# Patient Record
Sex: MALE | Race: WHITE | NOT HISPANIC OR LATINO | Employment: OTHER | URBAN - METROPOLITAN AREA
[De-identification: names, ages, dates, MRNs, and addresses within clinical notes are randomized per-mention and may not be internally consistent; named-entity substitution may affect disease eponyms.]

---

## 2019-12-10 PROBLEM — M25.552 PAIN OF BOTH HIP JOINTS: Status: ACTIVE | Noted: 2019-05-08

## 2019-12-10 PROBLEM — N31.9 BLADDER DYSFUNCTION: Status: ACTIVE | Noted: 2019-10-10

## 2019-12-10 PROBLEM — I71.21 ASCENDING AORTIC ANEURYSM: Status: ACTIVE | Noted: 2019-09-18

## 2019-12-10 PROBLEM — J44.9 COPD (CHRONIC OBSTRUCTIVE PULMONARY DISEASE): Status: ACTIVE | Noted: 2019-12-10

## 2019-12-10 PROBLEM — I25.10 CORONARY ARTERY DISEASE INVOLVING NATIVE CORONARY ARTERY OF NATIVE HEART WITHOUT ANGINA PECTORIS: Status: ACTIVE | Noted: 2018-03-21

## 2019-12-10 PROBLEM — R54 FRAILTY: Status: ACTIVE | Noted: 2019-10-10

## 2019-12-10 PROBLEM — I42.0 DILATED CARDIOMYOPATHY: Status: ACTIVE | Noted: 2017-11-16

## 2019-12-10 PROBLEM — G89.29 CHRONIC LEFT HIP PAIN: Status: ACTIVE | Noted: 2019-08-23

## 2019-12-10 PROBLEM — M25.552 CHRONIC LEFT HIP PAIN: Status: ACTIVE | Noted: 2019-08-23

## 2019-12-10 PROBLEM — N18.30 CHRONIC RENAL DISEASE, STAGE 3, MODERATELY DECREASED GLOMERULAR FILTRATION RATE BETWEEN 30-59 ML/MIN/1.73 SQUARE METER: Status: ACTIVE | Noted: 2019-09-06

## 2019-12-10 PROBLEM — N28.89 BILATERAL RENAL MASSES: Status: ACTIVE | Noted: 2019-12-10

## 2019-12-10 PROBLEM — E78.2 MIXED HYPERLIPIDEMIA: Status: ACTIVE | Noted: 2017-08-25

## 2019-12-10 PROBLEM — M25.561 CHRONIC PAIN OF BOTH KNEES: Status: ACTIVE | Noted: 2019-05-08

## 2019-12-10 PROBLEM — M85.859 OSTEOPENIA OF HIP: Status: ACTIVE | Noted: 2019-09-04

## 2019-12-10 PROBLEM — M25.511 CHRONIC PAIN OF BOTH SHOULDERS: Status: ACTIVE | Noted: 2018-03-07

## 2019-12-10 PROBLEM — G89.29 CHRONIC PAIN OF BOTH KNEES: Status: ACTIVE | Noted: 2019-05-08

## 2019-12-10 PROBLEM — Q87.40 MARFAN'S SYNDROME: Status: ACTIVE | Noted: 2019-12-10

## 2019-12-10 PROBLEM — M16.12 PRIMARY OSTEOARTHRITIS OF LEFT HIP: Status: ACTIVE | Noted: 2019-08-23

## 2019-12-10 PROBLEM — Z95.810 BIVENTRICULAR AUTOMATIC IMPLANTABLE CARDIOVERTER DEFIBRILLATOR IN SITU: Status: ACTIVE | Noted: 2019-12-10

## 2019-12-10 PROBLEM — M25.562 CHRONIC PAIN OF BOTH KNEES: Status: ACTIVE | Noted: 2019-05-08

## 2019-12-10 PROBLEM — G89.29 CHRONIC LEFT SHOULDER PAIN: Status: ACTIVE | Noted: 2018-03-07

## 2019-12-10 PROBLEM — I50.22 CHRONIC SYSTOLIC CONGESTIVE HEART FAILURE: Status: ACTIVE | Noted: 2019-12-10

## 2019-12-10 PROBLEM — I10 ESSENTIAL HYPERTENSION: Status: ACTIVE | Noted: 2017-08-25

## 2019-12-10 PROBLEM — M25.551 PAIN OF BOTH HIP JOINTS: Status: ACTIVE | Noted: 2019-05-08

## 2019-12-10 PROBLEM — M25.512 CHRONIC LEFT SHOULDER PAIN: Status: ACTIVE | Noted: 2018-03-07

## 2019-12-20 PROBLEM — Z95.2 H/O MECHANICAL AORTIC VALVE REPLACEMENT: Status: ACTIVE | Noted: 2019-12-20

## 2020-06-23 PROBLEM — G89.29 CHRONIC LEFT HIP PAIN: Status: RESOLVED | Noted: 2019-08-23 | Resolved: 2020-06-23

## 2020-06-23 PROBLEM — M25.552 CHRONIC LEFT HIP PAIN: Status: RESOLVED | Noted: 2019-08-23 | Resolved: 2020-06-23

## 2021-05-12 PROBLEM — K59.00 CONSTIPATION: Status: ACTIVE | Noted: 2021-05-12

## 2021-05-25 PROBLEM — N30.01 ACUTE CYSTITIS WITH HEMATURIA: Status: ACTIVE | Noted: 2021-05-25

## 2021-05-27 PROBLEM — I50.22 CHRONIC SYSTOLIC CONGESTIVE HEART FAILURE: Status: RESOLVED | Noted: 2019-12-10 | Resolved: 2021-05-27

## 2021-06-01 ENCOUNTER — PATIENT OUTREACH (OUTPATIENT)
Dept: ADMINISTRATIVE | Facility: CLINIC | Age: 71
End: 2021-06-01

## 2021-06-02 PROBLEM — R22.42 MASS OF LEFT FOOT: Status: ACTIVE | Noted: 2021-06-02

## 2021-07-22 PROBLEM — M54.42 MIDLINE LOW BACK PAIN WITH LEFT-SIDED SCIATICA: Status: ACTIVE | Noted: 2021-07-22

## 2021-07-22 PROBLEM — M89.9 LYTIC LESION OF BONE ON X-RAY: Status: ACTIVE | Noted: 2021-07-22

## 2021-10-12 PROBLEM — I50.9 CHF WITH CARDIOMYOPATHY: Status: ACTIVE | Noted: 2021-10-12

## 2021-10-12 PROBLEM — I42.9 CHF WITH CARDIOMYOPATHY: Status: ACTIVE | Noted: 2021-10-12

## 2021-10-15 PROBLEM — K59.00 CONSTIPATION: Status: RESOLVED | Noted: 2021-05-12 | Resolved: 2021-10-15

## 2021-10-15 PROBLEM — N30.01 ACUTE CYSTITIS WITH HEMATURIA: Status: RESOLVED | Noted: 2021-05-25 | Resolved: 2021-10-15

## 2021-10-15 PROBLEM — T82.837A PACEMAKER POCKET HEMATOMA: Status: ACTIVE | Noted: 2021-10-15

## 2021-10-15 PROBLEM — N18.30 CHRONIC RENAL DISEASE, STAGE 3, MODERATELY DECREASED GLOMERULAR FILTRATION RATE BETWEEN 30-59 ML/MIN/1.73 SQUARE METER: Status: RESOLVED | Noted: 2019-09-06 | Resolved: 2021-10-15

## 2021-10-20 PROBLEM — I42.0 DILATED CARDIOMYOPATHY: Status: RESOLVED | Noted: 2017-11-16 | Resolved: 2021-10-20

## 2022-04-07 PROBLEM — F11.90 CHRONIC NARCOTIC USE: Status: ACTIVE | Noted: 2022-04-07

## 2022-04-07 PROBLEM — M19.031 ARTHRITIS OF BOTH WRISTS: Status: ACTIVE | Noted: 2022-04-07

## 2022-04-07 PROBLEM — M19.032 ARTHRITIS OF BOTH WRISTS: Status: ACTIVE | Noted: 2022-04-07

## 2022-05-04 PROBLEM — L02.612 ABSCESS OF LEFT FOOT: Status: ACTIVE | Noted: 2022-05-04

## 2022-05-04 PROBLEM — R19.5 POSITIVE FECAL OCCULT BLOOD TEST: Status: ACTIVE | Noted: 2022-05-04

## 2022-07-18 PROBLEM — Z79.01 ANTICOAGULATION MONITORING, INR RANGE 2.5-3.5: Status: ACTIVE | Noted: 2022-07-18

## 2022-09-22 PROBLEM — U07.1 COVID-19: Status: ACTIVE | Noted: 2022-09-22

## 2022-09-22 PROBLEM — R06.02 SHORTNESS OF BREATH: Status: ACTIVE | Noted: 2022-09-22

## 2022-09-22 PROBLEM — R13.10 DYSPHAGIA: Status: ACTIVE | Noted: 2022-09-22

## 2022-09-24 PROBLEM — R42 DIZZINESS: Status: ACTIVE | Noted: 2022-09-24

## 2022-09-24 PROBLEM — I71.40 ABDOMINAL AORTIC ANEURYSM (AAA) WITHOUT RUPTURE: Status: ACTIVE | Noted: 2022-09-24

## 2022-09-25 PROBLEM — I95.9 HYPOTENSION: Status: ACTIVE | Noted: 2022-09-25

## 2022-09-26 PROBLEM — I95.9 HYPOTENSION: Status: RESOLVED | Noted: 2022-09-25 | Resolved: 2022-09-26

## 2022-09-27 ENCOUNTER — PATIENT OUTREACH (OUTPATIENT)
Dept: ADMINISTRATIVE | Facility: CLINIC | Age: 72
End: 2022-09-27
Payer: MEDICARE

## 2022-09-27 NOTE — PROGRESS NOTES
C3 nurse attempted to contact Allijose Mcnally for a TCC post hospital discharge follow up call. The patient is unable to conduct the call @ this time.     The patient does not have a scheduled HOS[facility] appointment within 5-7 days post hospital discharge date 9/26/22. Message sent to Physician staff to assist with HOS[facility] appointment scheduling.

## 2022-10-17 PROBLEM — R53.1 WEAKNESS: Status: ACTIVE | Noted: 2022-10-17

## 2022-10-24 PROBLEM — R51.9 CHRONIC INTRACTABLE HEADACHE: Status: ACTIVE | Noted: 2022-10-24

## 2022-10-24 PROBLEM — G89.29 CHRONIC INTRACTABLE HEADACHE: Status: ACTIVE | Noted: 2022-10-24

## 2022-10-25 PROBLEM — R91.8 MASS OF UPPER LOBE OF LEFT LUNG: Status: ACTIVE | Noted: 2022-10-25

## 2022-10-25 PROBLEM — G44.89 OTHER HEADACHE SYNDROME: Status: ACTIVE | Noted: 2022-10-24

## 2022-10-25 PROBLEM — R51.9 INTRACTABLE HEADACHE: Status: ACTIVE | Noted: 2022-10-25

## 2022-10-27 PROBLEM — S06.5XAA BILATERAL SUBDURAL HEMATOMAS: Status: ACTIVE | Noted: 2022-10-27

## 2022-10-31 ENCOUNTER — PATIENT OUTREACH (OUTPATIENT)
Dept: ADMINISTRATIVE | Facility: CLINIC | Age: 72
End: 2022-10-31
Payer: MEDICARE

## 2022-11-01 PROBLEM — S06.5XAA SUBDURAL HEMATOMA: Status: ACTIVE | Noted: 2022-11-01

## 2022-11-01 PROBLEM — I49.9 CARDIAC ARRHYTHMIA: Status: ACTIVE | Noted: 2022-11-01

## 2022-11-01 PROBLEM — E87.6 HYPOKALEMIA: Status: ACTIVE | Noted: 2022-11-01

## 2022-11-02 PROBLEM — E87.6 HYPOKALEMIA: Status: RESOLVED | Noted: 2022-11-01 | Resolved: 2022-11-02

## 2022-11-02 PROBLEM — Z95.810 BIVENTRICULAR AUTOMATIC IMPLANTABLE CARDIOVERTER DEFIBRILLATOR IN SITU: Status: ACTIVE | Noted: 2022-11-02

## 2022-11-02 PROBLEM — G93.40 ACUTE ENCEPHALOPATHY: Status: ACTIVE | Noted: 2022-11-02

## 2022-11-03 PROBLEM — N39.0 UTI (URINARY TRACT INFECTION): Status: ACTIVE | Noted: 2022-11-03
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
## 2022-11-04 PROBLEM — J18.9 HOSPITAL ACQUIRED PNA: Status: ACTIVE | Noted: 2022-11-04

## 2022-11-11 PROBLEM — K82.1 HYDROPS OF GALLBLADDER: Status: ACTIVE | Noted: 2022-11-11

## 2023-02-13 PROBLEM — J18.9 HOSPITAL ACQUIRED PNA: Status: RESOLVED | Noted: 2022-11-04 | Resolved: 2023-02-13

## 2023-02-13 PROBLEM — N39.0 UTI (URINARY TRACT INFECTION): Status: RESOLVED | Noted: 2022-11-03 | Resolved: 2023-02-13
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
